# Patient Record
Sex: MALE | Race: WHITE | NOT HISPANIC OR LATINO
[De-identification: names, ages, dates, MRNs, and addresses within clinical notes are randomized per-mention and may not be internally consistent; named-entity substitution may affect disease eponyms.]

---

## 2017-03-28 ENCOUNTER — APPOINTMENT (OUTPATIENT)
Dept: OPHTHALMOLOGY | Facility: CLINIC | Age: 62
End: 2017-03-28

## 2017-03-28 DIAGNOSIS — H01.004 UNSPECIFIED BLEPHARITIS RIGHT UPPER EYELID: ICD-10-CM

## 2017-03-28 DIAGNOSIS — H01.005 UNSPECIFIED BLEPHARITIS RIGHT UPPER EYELID: ICD-10-CM

## 2017-03-28 DIAGNOSIS — H01.002 UNSPECIFIED BLEPHARITIS RIGHT UPPER EYELID: ICD-10-CM

## 2017-03-28 DIAGNOSIS — H01.001 UNSPECIFIED BLEPHARITIS RIGHT UPPER EYELID: ICD-10-CM

## 2021-08-23 ENCOUNTER — APPOINTMENT (OUTPATIENT)
Dept: NEPHROLOGY | Facility: CLINIC | Age: 66
End: 2021-08-23
Payer: COMMERCIAL

## 2021-08-23 VITALS — DIASTOLIC BLOOD PRESSURE: 81 MMHG | SYSTOLIC BLOOD PRESSURE: 149 MMHG

## 2021-08-23 VITALS
HEART RATE: 77 BPM | DIASTOLIC BLOOD PRESSURE: 81 MMHG | BODY MASS INDEX: 31.28 KG/M2 | SYSTOLIC BLOOD PRESSURE: 154 MMHG | WEIGHT: 218 LBS

## 2021-08-23 DIAGNOSIS — Z72.89 OTHER PROBLEMS RELATED TO LIFESTYLE: ICD-10-CM

## 2021-08-23 DIAGNOSIS — Z78.9 OTHER SPECIFIED HEALTH STATUS: ICD-10-CM

## 2021-08-23 PROCEDURE — 99244 OFF/OP CNSLTJ NEW/EST MOD 40: CPT

## 2021-08-23 RX ORDER — AMLODIPINE BESYLATE 10 MG/1
10 TABLET ORAL DAILY
Qty: 90 | Refills: 3 | Status: ACTIVE | COMMUNITY
Start: 2021-08-23

## 2021-08-23 RX ORDER — VALACYCLOVIR 1 G/1
1 TABLET, FILM COATED ORAL
Qty: 30 | Refills: 0 | Status: ACTIVE | COMMUNITY
Start: 2021-01-22

## 2021-08-23 RX ORDER — INSULIN LISPRO 100 [IU]/ML
(50-50) 100 INJECTION, SUSPENSION SUBCUTANEOUS
Qty: 75 | Refills: 0 | Status: ACTIVE | COMMUNITY
Start: 2021-07-08

## 2021-08-23 RX ORDER — EMPAGLIFLOZIN 10 MG/1
10 TABLET, FILM COATED ORAL
Qty: 30 | Refills: 5 | Status: ACTIVE | COMMUNITY
Start: 2021-03-19

## 2021-08-23 RX ORDER — HYDROCHLOROTHIAZIDE 25 MG/1
25 TABLET ORAL
Qty: 90 | Refills: 3 | Status: ACTIVE | COMMUNITY
Start: 2021-08-23

## 2021-08-23 RX ORDER — SEMAGLUTIDE 1.34 MG/ML
2 INJECTION, SOLUTION SUBCUTANEOUS
Qty: 9 | Refills: 0 | Status: ACTIVE | COMMUNITY
Start: 2021-04-27

## 2021-08-23 RX ORDER — UBROGEPANT 100 MG/1
100 TABLET ORAL
Qty: 10 | Refills: 0 | Status: ACTIVE | COMMUNITY
Start: 2021-03-22

## 2021-08-25 NOTE — CONSULT LETTER
[Dear  ___] : Dear  [unfilled], [Consult Letter:] : I had the pleasure of evaluating your patient, [unfilled]. [Please see my note below.] : Please see my note below. [Sincerely,] : Sincerely, [FreeTextEntry3] : Reuben Saavedra MD, STEPHANY\par Division of Nephrology\par [facility]\par  of Medicine\par [facility] [employer] \par \par \par \par \par

## 2021-08-25 NOTE — HISTORY OF PRESENT ILLNESS
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 Of note has been on buproprion for depression (which has worked) probably added about 2 years ago.\par no significant sx incl headaches, palp, SOB, CP \par has lost weight with diet --and on Ozempic abt 2 years  now\par BP recently very high with PCP --though was good (abt 120/80) at endo visit -- hasn’t checked at home in mos but last time did was high \par recent labs (6/9) -cr 0.7, k 3.6, hco3 32, UA neg prot, cells, ur microalb 48 \par hx left adrenal adenoma-- PRA low with charisma 12 (blood) --initial AVS nondiagnostic - and  did not want to pursue surgical option so not repeated

## 2021-08-25 NOTE — ASSESSMENT
[FreeTextEntry1] : BP likely suboptimal on 5 meds \par hx adrenal adenoma possibly  hypersecreting - though charisma level wasn’t high last check \par also on NDRI (buproprion)  which can cause HTN and could explain worsening in recent past-- though says works very for his depression\par of note k low normal side last check \par will recheck lytes amd check charisma/renin\par consider increase in eplerenone to 50 mg BID-- and follow BP and lytes-- change to  more potent thiazide chlorthalidone from HCTZ could also help once K up. \par he will d/w his psychiatrist re bupoprion alternatives \par to consider re-address surgical option for adrenal adenoma if difficulty with mgt and looks like contributing \par likely f/u in 1-2 mos\par \par \par \par \par \par

## 2021-08-27 LAB
ALBUMIN SERPL ELPH-MCNC: 4.6 G/DL
ALDOSTERONE SERUM: 27.4 NG/DL
ALP BLD-CCNC: 68 U/L
ALT SERPL-CCNC: 17 U/L
ANION GAP SERPL CALC-SCNC: 14 MMOL/L
AST SERPL-CCNC: 15 U/L
BILIRUB SERPL-MCNC: 0.5 MG/DL
BUN SERPL-MCNC: 14 MG/DL
CALCIUM SERPL-MCNC: 9.8 MG/DL
CHLORIDE SERPL-SCNC: 99 MMOL/L
CO2 SERPL-SCNC: 26 MMOL/L
CREAT SERPL-MCNC: 0.67 MG/DL
GLUCOSE SERPL-MCNC: 106 MG/DL
MAGNESIUM SERPL-MCNC: 2.1 MG/DL
POTASSIUM SERPL-SCNC: 3.6 MMOL/L
PROT SERPL-MCNC: 7 G/DL
RENIN ACTIVITY, PLASMA: 0.34 NG/ML/HR
SODIUM SERPL-SCNC: 138 MMOL/L

## 2021-10-21 ENCOUNTER — APPOINTMENT (OUTPATIENT)
Dept: NEPHROLOGY | Facility: CLINIC | Age: 66
End: 2021-10-21
Payer: COMMERCIAL

## 2021-10-21 VITALS
DIASTOLIC BLOOD PRESSURE: 88 MMHG | SYSTOLIC BLOOD PRESSURE: 154 MMHG | BODY MASS INDEX: 31.28 KG/M2 | HEART RATE: 80 BPM | WEIGHT: 218 LBS

## 2021-10-21 PROCEDURE — 99215 OFFICE O/P EST HI 40 MIN: CPT

## 2021-10-21 RX ORDER — BUPROPION HYDROCHLORIDE 150 MG/1
150 TABLET, EXTENDED RELEASE ORAL
Qty: 90 | Refills: 0 | Status: DISCONTINUED | COMMUNITY
Start: 2021-04-06 | End: 2021-10-21

## 2021-10-21 NOTE — HISTORY OF PRESENT ILLNESS
[FreeTextEntry1] : f/u resistant HTN \par taken off buproprion after last visit w/o depression issue\par eplerenone increased to 50 BID\par BPs initially 140s-150s/80s at home in August \par now [id number]\par no complaints\par meds reviewed with pt and list updated-- not needed migraine med as no headache sx \par labs done and reviewed - see below\par PCP Dr Dr Ed Goldberg \par

## 2021-10-21 NOTE — ASSESSMENT
[FreeTextEntry1] : Resistant HTN -- BPs improved on higher eplerenone (and off NDRI)-- though still not optimal\par K still low normal side \par charisma/renin ratio suggestive of hyperaldo \par will increase eplerenone to 100 BID \par change clonidine to patch for convenience and more continuous level with short acting med \par consider change of losartan to more potent ARB and HCTZ to more potent thiiazide (once K higher) \par discussed potential revisit adenoma resection --though presence of Charisma blockade may majke confirmation of unilteral  secretion  more difficult with AVS --  pt may consider paulette if trouble controlling with meds \par f/u one month -- recheck labs prior

## 2021-11-05 ENCOUNTER — OUTPATIENT (OUTPATIENT)
Dept: OUTPATIENT SERVICES | Facility: HOSPITAL | Age: 66
LOS: 1 days | End: 2021-11-05
Payer: COMMERCIAL

## 2021-11-05 DIAGNOSIS — D45 POLYCYTHEMIA VERA: ICD-10-CM

## 2021-11-05 PROCEDURE — 99195 PHLEBOTOMY: CPT

## 2021-11-11 ENCOUNTER — OUTPATIENT (OUTPATIENT)
Dept: OUTPATIENT SERVICES | Facility: HOSPITAL | Age: 66
LOS: 1 days | End: 2021-11-11
Payer: COMMERCIAL

## 2021-11-11 DIAGNOSIS — D45 POLYCYTHEMIA VERA: ICD-10-CM

## 2021-11-11 LAB
HCT VFR BLD CALC: 49.5 % — SIGNIFICANT CHANGE UP (ref 39–50)
HGB BLD-MCNC: 17.2 G/DL — HIGH (ref 13–17)
LYMPHOCYTES # BLD AUTO: 1.8 K/UL — SIGNIFICANT CHANGE UP (ref 1–3.3)
LYMPHOCYTES # BLD AUTO: 27.9 % — SIGNIFICANT CHANGE UP (ref 13–44)
MCHC RBC-ENTMCNC: 32.5 PG — SIGNIFICANT CHANGE UP (ref 27–34)
MCHC RBC-ENTMCNC: 34.7 GM/DL — SIGNIFICANT CHANGE UP (ref 32–36)
MCV RBC AUTO: 93.6 FL — SIGNIFICANT CHANGE UP (ref 80–100)
NEUTROPHILS # BLD AUTO: 4.1 K/UL — SIGNIFICANT CHANGE UP (ref 1.8–7.4)
NEUTROPHILS NFR BLD AUTO: 61.3 % — SIGNIFICANT CHANGE UP (ref 43–77)
PLATELET # BLD AUTO: 194 K/UL — SIGNIFICANT CHANGE UP (ref 150–400)
RBC # BLD: 5.29 M/UL — SIGNIFICANT CHANGE UP (ref 4.2–5.8)
RBC # FLD: 13.3 % — SIGNIFICANT CHANGE UP (ref 10.3–14.5)
WBC # BLD: 6.6 K/UL — SIGNIFICANT CHANGE UP (ref 3.8–10.5)
WBC # FLD AUTO: 6.6 K/UL — SIGNIFICANT CHANGE UP (ref 3.8–10.5)

## 2021-11-11 PROCEDURE — 99195 PHLEBOTOMY: CPT

## 2021-11-11 PROCEDURE — 85025 COMPLETE CBC W/AUTO DIFF WBC: CPT

## 2021-11-22 ENCOUNTER — OUTPATIENT (OUTPATIENT)
Dept: OUTPATIENT SERVICES | Facility: HOSPITAL | Age: 66
LOS: 1 days | End: 2021-11-22
Payer: COMMERCIAL

## 2021-11-22 PROCEDURE — 99195 PHLEBOTOMY: CPT

## 2021-11-30 DIAGNOSIS — D45 POLYCYTHEMIA VERA: ICD-10-CM

## 2021-12-01 ENCOUNTER — APPOINTMENT (OUTPATIENT)
Dept: NEPHROLOGY | Facility: CLINIC | Age: 66
End: 2021-12-01
Payer: COMMERCIAL

## 2021-12-01 VITALS
WEIGHT: 216 LBS | BODY MASS INDEX: 30.99 KG/M2 | SYSTOLIC BLOOD PRESSURE: 135 MMHG | HEART RATE: 72 BPM | DIASTOLIC BLOOD PRESSURE: 77 MMHG

## 2021-12-01 PROCEDURE — 99214 OFFICE O/P EST MOD 30 MIN: CPT

## 2021-12-01 NOTE — HISTORY OF PRESENT ILLNESS
[FreeTextEntry1] : f/u resistant HTN \par is on clonidine patch instead of PO  and on higher eplerenone dose since last visit \par also getting  phlebotomy for high hgb (weekly) arranged by PCP \par BPs have improved -- now running 120s-130s/ 70 range though occasionally up  to 150 systolic\par had one episode low BP after exercise with fatigue \par notes more lightheadedness when gets up (abt 40% of time) lasts < 1 minute\par labs done and reviewed - see below\par meds reviewed with pt and list updated\par PCP Dr Goldberg\par

## 2021-12-01 NOTE — ASSESSMENT
[FreeTextEntry1] : BP much better controlled \par having some orthostatic symptoms though \par will hold amlodipine and HCTZ before exercise in take after instead \par also try lower clonidine to 0.2 mg/d/dwk\par assure not over phlebotomized-- doubt as getting hgb checks\par may lower meds further if sx don’t improve\par f/u and recheck labs and BP 1-2 mos

## 2021-12-03 ENCOUNTER — OUTPATIENT (OUTPATIENT)
Dept: OUTPATIENT SERVICES | Facility: HOSPITAL | Age: 66
LOS: 1 days | End: 2021-12-03
Payer: COMMERCIAL

## 2021-12-03 DIAGNOSIS — D45 POLYCYTHEMIA VERA: ICD-10-CM

## 2021-12-03 PROCEDURE — 99195 PHLEBOTOMY: CPT

## 2021-12-10 ENCOUNTER — OUTPATIENT (OUTPATIENT)
Dept: OUTPATIENT SERVICES | Facility: HOSPITAL | Age: 66
LOS: 1 days | End: 2021-12-10
Payer: COMMERCIAL

## 2021-12-10 DIAGNOSIS — D45 POLYCYTHEMIA VERA: ICD-10-CM

## 2021-12-10 PROCEDURE — 99195 PHLEBOTOMY: CPT

## 2021-12-17 ENCOUNTER — OUTPATIENT (OUTPATIENT)
Dept: OUTPATIENT SERVICES | Facility: HOSPITAL | Age: 66
LOS: 1 days | End: 2021-12-17
Payer: COMMERCIAL

## 2021-12-17 DIAGNOSIS — D45 POLYCYTHEMIA VERA: ICD-10-CM

## 2021-12-17 PROCEDURE — 99195 PHLEBOTOMY: CPT

## 2022-01-31 ENCOUNTER — APPOINTMENT (OUTPATIENT)
Dept: NEPHROLOGY | Facility: CLINIC | Age: 67
End: 2022-01-31
Payer: COMMERCIAL

## 2022-01-31 VITALS
SYSTOLIC BLOOD PRESSURE: 142 MMHG | WEIGHT: 220 LBS | HEART RATE: 80 BPM | BODY MASS INDEX: 31.57 KG/M2 | DIASTOLIC BLOOD PRESSURE: 75 MMHG

## 2022-01-31 PROCEDURE — 99214 OFFICE O/P EST MOD 30 MIN: CPT

## 2022-02-01 NOTE — ASSESSMENT
[FreeTextEntry1] : Resistant HTN under better control - with less orthostasis on lower meds \par BP slt high here but in good range at home\par Lytes good\par charisma/renin ratio not very high --on meds \par cont regimen and monitor BP\par consider 24 hour ABPM if still discrepancy bet home and office\par f/u 4 mos\par \par \par

## 2022-02-01 NOTE — HISTORY OF PRESENT ILLNESS
[FreeTextEntry1] : f/u resistant HTN \par on lower clonidine patch  and holding meds befreo exercise-- orthostatic sx much improved\par BP at home ranging 120s-130s/70s generally-- 131/70 avg , HR 80s- brought in list of BPs and HR \par gained some weight during holidays \par meds reviewed with pt and list updated\par labs done and reviewed - see below\par PCP Dr Goldberg\par

## 2022-05-27 ENCOUNTER — APPOINTMENT (OUTPATIENT)
Dept: NEPHROLOGY | Facility: CLINIC | Age: 67
End: 2022-05-27
Payer: COMMERCIAL

## 2022-05-27 VITALS
HEART RATE: 88 BPM | WEIGHT: 216 LBS | SYSTOLIC BLOOD PRESSURE: 134 MMHG | DIASTOLIC BLOOD PRESSURE: 78 MMHG | BODY MASS INDEX: 30.99 KG/M2

## 2022-05-27 PROCEDURE — 99214 OFFICE O/P EST MOD 30 MIN: CPT

## 2022-05-27 RX ORDER — LOSARTAN POTASSIUM 100 MG/1
100 TABLET, FILM COATED ORAL DAILY
Qty: 90 | Refills: 3 | Status: DISCONTINUED | COMMUNITY
Start: 2021-08-23 | End: 2022-05-27

## 2022-05-27 NOTE — ASSESSMENT
[FreeTextEntry1] : Resisitant HTN --likely hyperaldosteronism (prefers medical mgt) \par BP mean slightly up from last time--though not bad\par lytes good and tolerating well -- PRA up suggesting have blocked aldosterone adequately (has generally been low) \par will try change losartan to more long acting and potent ARB \par cont rest of regimen\par follow home BP\par f/u 4 mos\par

## 2022-05-27 NOTE — PHYSICAL EXAM
[General Appearance - Alert] : alert [General Appearance - In No Acute Distress] : in no acute distress [Sclera] : the sclera and conjunctiva were normal [Jugular Venous Distention Increased] : there was no jugular-venous distention [Auscultation Breath Sounds / Voice Sounds] : lungs were clear to auscultation bilaterally [Heart Rate And Rhythm] : heart rate was normal and rhythm regular [Heart Sounds Gallop] : no gallops [Murmurs] : no murmurs [Heart Sounds Pericardial Friction Rub] : no pericardial rub [Edema] : there was no peripheral edema [No CVA Tenderness] : no ~M costovertebral angle tenderness [Involuntary Movements] : no involuntary movements were seen [] : no rash [No Focal Deficits] : no focal deficits [Oriented To Time, Place, And Person] : oriented to person, place, and time [Affect] : the affect was normal

## 2022-09-23 ENCOUNTER — APPOINTMENT (OUTPATIENT)
Dept: NEPHROLOGY | Facility: CLINIC | Age: 67
End: 2022-09-23

## 2022-09-23 VITALS
WEIGHT: 221 LBS | HEIGHT: 70 IN | SYSTOLIC BLOOD PRESSURE: 127 MMHG | HEART RATE: 86 BPM | OXYGEN SATURATION: 95 % | DIASTOLIC BLOOD PRESSURE: 74 MMHG | BODY MASS INDEX: 31.64 KG/M2

## 2022-09-23 DIAGNOSIS — D35.02 BENIGN NEOPLASM OF LEFT ADRENAL GLAND: ICD-10-CM

## 2022-09-23 DIAGNOSIS — R42 DIZZINESS AND GIDDINESS: ICD-10-CM

## 2022-09-23 PROCEDURE — 99214 OFFICE O/P EST MOD 30 MIN: CPT

## 2022-09-23 RX ORDER — OMEPRAZOLE MAGNESIUM 20 MG/1
20 CAPSULE, DELAYED RELEASE ORAL
Refills: 0 | Status: ACTIVE | COMMUNITY

## 2022-09-23 RX ORDER — TADALAFIL 5 MG/1
5 TABLET ORAL
Refills: 0 | Status: ACTIVE | COMMUNITY

## 2022-09-23 NOTE — ASSESSMENT
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

## 2022-09-23 NOTE — HISTORY OF PRESENT ILLNESS
[FreeTextEntry1] : resistant HTN f/u\par still with lightheadeness/ dizziness when  gets up after seated for a while --lasts minute or two \par BP has been very good --mean 120/63 \par no other complaints\par PCP \par \par

## 2023-01-20 ENCOUNTER — APPOINTMENT (OUTPATIENT)
Dept: NEPHROLOGY | Facility: CLINIC | Age: 68
End: 2023-01-20
Payer: COMMERCIAL

## 2023-01-20 VITALS — HEART RATE: 80 BPM | OXYGEN SATURATION: 96 % | DIASTOLIC BLOOD PRESSURE: 68 MMHG | SYSTOLIC BLOOD PRESSURE: 119 MMHG

## 2023-01-20 PROCEDURE — 99213 OFFICE O/P EST LOW 20 MIN: CPT

## 2023-01-20 NOTE — ASSESSMENT
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

## 2023-05-19 ENCOUNTER — RX RENEWAL (OUTPATIENT)
Age: 68
End: 2023-05-19

## 2023-07-07 ENCOUNTER — APPOINTMENT (OUTPATIENT)
Dept: NEPHROLOGY | Facility: CLINIC | Age: 68
End: 2023-07-07
Payer: COMMERCIAL

## 2023-07-07 VITALS
SYSTOLIC BLOOD PRESSURE: 134 MMHG | DIASTOLIC BLOOD PRESSURE: 70 MMHG | BODY MASS INDEX: 30.99 KG/M2 | HEART RATE: 80 BPM | WEIGHT: 216 LBS

## 2023-07-07 PROCEDURE — 99214 OFFICE O/P EST MOD 30 MIN: CPT

## 2023-07-07 NOTE — ASSESSMENT
[FreeTextEntry1] : resistant HTN remains under good control on regimen--tolerating well \par lytes good , no albuminuria \par cont regimen \par hgh hgb noted - has had in past (hgb 18 in 2021)  -- advised d/w PCP re heme eval as doesn’t think has had  - pt nonsmoker\par f/u here 6 mos

## 2023-07-07 NOTE — HISTORY OF PRESENT ILLNESS
[FreeTextEntry1] : f/u resistant HTN\par no complaints\par BP has been very good at home-- 110-120 systolic generally \par  dizziness much less than before -- now minimal\par meds reviewed -no change\par labs done and reviewed - see below\par PCP Dr Mesfin Romero [address] 
IMPROVE-DD Application Not Available

## 2023-08-23 ENCOUNTER — RX RENEWAL (OUTPATIENT)
Age: 68
End: 2023-08-23

## 2023-08-23 RX ORDER — CLONIDINE 0.1 MG/24H
0.1 PATCH, EXTENDED RELEASE TRANSDERMAL
Qty: 12 | Refills: 3 | Status: ACTIVE | COMMUNITY
Start: 2021-12-01 | End: 1900-01-01

## 2023-12-23 ENCOUNTER — RX RENEWAL (OUTPATIENT)
Age: 68
End: 2023-12-23

## 2024-03-18 ENCOUNTER — APPOINTMENT (OUTPATIENT)
Dept: NEPHROLOGY | Facility: CLINIC | Age: 69
End: 2024-03-18
Payer: COMMERCIAL

## 2024-03-18 VITALS
DIASTOLIC BLOOD PRESSURE: 78 MMHG | WEIGHT: 210 LBS | HEART RATE: 83 BPM | SYSTOLIC BLOOD PRESSURE: 124 MMHG | BODY MASS INDEX: 31.1 KG/M2 | HEIGHT: 69 IN

## 2024-03-18 DIAGNOSIS — I1A.0 RESISTANT HYPERTENSION: ICD-10-CM

## 2024-03-18 DIAGNOSIS — I10 ESSENTIAL (PRIMARY) HYPERTENSION: ICD-10-CM

## 2024-03-18 PROCEDURE — 99213 OFFICE O/P EST LOW 20 MIN: CPT

## 2024-03-18 PROCEDURE — G2211 COMPLEX E/M VISIT ADD ON: CPT | Mod: NC,1L

## 2024-03-18 NOTE — PHYSICAL EXAM
[General Appearance - Alert] : alert [General Appearance - In No Acute Distress] : in no acute distress [Sclera] : the sclera and conjunctiva were normal [Extraocular Movements] : extraocular movements were intact [Neck Appearance] : the appearance of the neck was normal [Auscultation Breath Sounds / Voice Sounds] : lungs were clear to auscultation bilaterally [Heart Rate And Rhythm] : heart rate was normal and rhythm regular [Heart Sounds] : normal S1 and S2 [Murmurs] : no murmurs [Edema] : there was no peripheral edema [Abdomen Soft] : soft [Abdomen Tenderness] : non-tender [Abnormal Walk] : normal gait [Nail Clubbing] : no clubbing  or cyanosis of the fingernails [] : no rash [No Focal Deficits] : no focal deficits [Oriented To Time, Place, And Person] : oriented to person, place, and time

## 2024-03-19 NOTE — HISTORY OF PRESENT ILLNESS
[FreeTextEntry1] : F/u Hyperaldosteronism and resistant HTN Overall, health has been well - increased anxiety recently over "legal issue:" and BP up last two weeks  L shoulder arthritis s/p PT, now improved Checking BP at home - 120s-140s lately-- generally better  No lightheaded/dizziness with standing recently No urinary symptoms other than increased frequency No recent fevers/chills, illness On ozempic - appetite decreased, losing weight down to 210lb Labs reviewed - ACR 5, Cr 0.76, K 4.0, albumin 4.4

## 2024-03-19 NOTE — ASSESSMENT
[FreeTextEntry1] : Resistant HTN, ?hyperaldo - Labs/lytes  good, no albuminuria - BP good here --slightly elevated recently with anxiety at home - Continue current meds - Continue monitoring BP at home-- call if not remaining in good range  - Return to ofc in 6 months with repeat labs

## 2024-03-19 NOTE — REVIEW OF SYSTEMS
[Cough] : cough [Arthralgias] : arthralgias [Negative] : ENT [Fever] : no fever [Chest Pain] : no chest pain [Chills] : no chills [Palpitations] : no palpitations [Lower Ext Edema] : no extremity edema [Shortness Of Breath] : no shortness of breath [Wheezing] : no wheezing [Vomiting] : no vomiting [Abdominal Pain] : no abdominal pain [Diarrhea] : no diarrhea [Skin Lesions] : no skin lesions [Dysuria] : no dysuria [Dizziness] : no dizziness [Fainting] : no fainting [Difficulty Walking] : no difficulty walking [Easy Bleeding] : no tendency for easy bleeding [Easy Bruising] : no tendency for easy bruising [FreeTextEntry6] : Cough a few months ago, now improved

## 2024-05-04 RX ORDER — OLMESARTAN MEDOXOMIL 40 MG/1
40 TABLET, FILM COATED ORAL
Qty: 90 | Refills: 3 | Status: ACTIVE | COMMUNITY
Start: 2022-05-27 | End: 1900-01-01

## 2024-07-12 ENCOUNTER — RX RENEWAL (OUTPATIENT)
Age: 69
End: 2024-07-12

## 2024-09-16 ENCOUNTER — APPOINTMENT (OUTPATIENT)
Dept: NEPHROLOGY | Facility: CLINIC | Age: 69
End: 2024-09-16
Payer: COMMERCIAL

## 2024-09-16 VITALS
SYSTOLIC BLOOD PRESSURE: 125 MMHG | HEART RATE: 71 BPM | WEIGHT: 214 LBS | DIASTOLIC BLOOD PRESSURE: 75 MMHG | BODY MASS INDEX: 31.6 KG/M2

## 2024-09-16 DIAGNOSIS — I1A.0 RESISTANT HYPERTENSION: ICD-10-CM

## 2024-09-16 PROCEDURE — 99213 OFFICE O/P EST LOW 20 MIN: CPT

## 2024-09-16 NOTE — HISTORY OF PRESENT ILLNESS
[FreeTextEntry1] : f/u resistant HTN  no complaints  BP well controlled at home -- brought in readings-=- avg 120/65 no more dizziness sx with getting up  meds reviewed with pt-- no change labs done and reviewed - 9/11: ur alb 5, cr 0.7, k 3.9, hco3 28, ca 9.8, uric 5.5, hgb 17.4  PCP Mesfin Huynh

## 2024-09-16 NOTE — ASSESSMENT
[FreeTextEntry1] : resistant HTN and hyperaldo-- well controlled on regimen lytes good  no albuminuria  cont BP regimen cont to follow BP  advised f.u PCP re increased hgb-- has had in past attributed to testosterone-- non smoker  f/u 6 mos

## 2025-03-03 ENCOUNTER — APPOINTMENT (OUTPATIENT)
Dept: NEPHROLOGY | Facility: CLINIC | Age: 70
End: 2025-03-03
Payer: COMMERCIAL

## 2025-03-03 ENCOUNTER — NON-APPOINTMENT (OUTPATIENT)
Age: 70
End: 2025-03-03

## 2025-03-03 VITALS
WEIGHT: 213 LBS | BODY MASS INDEX: 31.45 KG/M2 | DIASTOLIC BLOOD PRESSURE: 68 MMHG | HEART RATE: 73 BPM | SYSTOLIC BLOOD PRESSURE: 122 MMHG

## 2025-03-03 DIAGNOSIS — D75.1 SECONDARY POLYCYTHEMIA: ICD-10-CM

## 2025-03-03 DIAGNOSIS — I1A.0 RESISTANT HYPERTENSION: ICD-10-CM

## 2025-03-03 DIAGNOSIS — E66.811 OBESITY, CLASS 1: ICD-10-CM

## 2025-03-03 PROCEDURE — 99214 OFFICE O/P EST MOD 30 MIN: CPT

## 2025-03-03 RX ORDER — TIRZEPATIDE 7.5 MG/.5ML
7.5 INJECTION, SOLUTION SUBCUTANEOUS
Qty: 6 | Refills: 0 | Status: ACTIVE | COMMUNITY
Start: 2024-12-18

## 2025-03-03 RX ORDER — CLONAZEPAM 2 MG/1
2 TABLET ORAL
Qty: 30 | Refills: 0 | Status: ACTIVE | COMMUNITY
Start: 2025-02-05

## 2025-04-18 ENCOUNTER — RX RENEWAL (OUTPATIENT)
Age: 70
End: 2025-04-18